# Patient Record
Sex: FEMALE | Race: WHITE | Employment: FULL TIME | URBAN - METROPOLITAN AREA
[De-identification: names, ages, dates, MRNs, and addresses within clinical notes are randomized per-mention and may not be internally consistent; named-entity substitution may affect disease eponyms.]

---

## 2018-08-14 ENCOUNTER — HOSPITAL ENCOUNTER (OUTPATIENT)
Dept: GENERAL RADIOLOGY | Age: 28
Discharge: HOME | End: 2018-08-14
Payer: COMMERCIAL

## 2018-08-14 ENCOUNTER — HOSPITAL ENCOUNTER (OUTPATIENT)
Dept: WOUND CARE | Age: 28
Discharge: HOME | End: 2018-08-14
Payer: COMMERCIAL

## 2018-08-14 ENCOUNTER — HOSPITAL ENCOUNTER (OUTPATIENT)
Age: 28
Discharge: HOME | End: 2018-08-14
Payer: COMMERCIAL

## 2018-08-14 VITALS
TEMPERATURE: 97.9 F | RESPIRATION RATE: 18 BRPM | DIASTOLIC BLOOD PRESSURE: 83 MMHG | SYSTOLIC BLOOD PRESSURE: 124 MMHG | HEART RATE: 103 BPM

## 2018-08-14 DIAGNOSIS — L02.612 CELLULITIS AND ABSCESS OF TOE OF LEFT FOOT: ICD-10-CM

## 2018-08-14 DIAGNOSIS — L03.032 CELLULITIS OF TOE OF LEFT FOOT: Primary | ICD-10-CM

## 2018-08-14 DIAGNOSIS — L03.032 CELLULITIS AND ABSCESS OF TOE OF LEFT FOOT: ICD-10-CM

## 2018-08-14 DIAGNOSIS — L03.032 CELLULITIS OF TOE OF LEFT FOOT: ICD-10-CM

## 2018-08-14 PROCEDURE — 73630 X-RAY EXAM OF FOOT: CPT

## 2018-08-14 PROCEDURE — 99202 OFFICE O/P NEW SF 15 MIN: CPT

## 2018-08-14 PROCEDURE — 6370000000 HC RX 637 (ALT 250 FOR IP): Performed by: PODIATRIST

## 2018-08-14 RX ORDER — LIDOCAINE HYDROCHLORIDE 40 MG/ML
2.5 SOLUTION TOPICAL ONCE
Status: COMPLETED | OUTPATIENT
Start: 2018-08-14 | End: 2018-08-14

## 2018-08-14 RX ORDER — MIRABEGRON 50 MG/1
1 TABLET, FILM COATED, EXTENDED RELEASE ORAL DAILY
COMMUNITY
Start: 2018-08-05 | End: 2019-01-16 | Stop reason: SDUPTHER

## 2018-08-14 RX ORDER — IBUPROFEN 200 MG
200 TABLET ORAL EVERY 6 HOURS PRN
COMMUNITY

## 2018-08-14 RX ORDER — OXYBUTYNIN CHLORIDE 15 MG/1
15 TABLET, EXTENDED RELEASE ORAL DAILY
COMMUNITY
End: 2019-05-14 | Stop reason: SDUPTHER

## 2018-08-14 RX ORDER — SULFAMETHOXAZOLE AND TRIMETHOPRIM 800; 160 MG/1; MG/1
1 TABLET ORAL 2 TIMES DAILY
Qty: 20 TABLET | Refills: 0 | Status: SHIPPED | OUTPATIENT
Start: 2018-08-14 | End: 2018-08-24

## 2018-08-14 RX ADMIN — LIDOCAINE HYDROCHLORIDE 2.5 ML: 40 SOLUTION TOPICAL at 14:30

## 2018-08-16 PROBLEM — L02.619 CELLULITIS AND ABSCESS OF TOE: Status: ACTIVE | Noted: 2018-08-16

## 2018-08-16 PROBLEM — L03.039 CELLULITIS AND ABSCESS OF TOE: Status: ACTIVE | Noted: 2018-08-16

## 2018-08-16 NOTE — PROGRESS NOTES
[facility]   Progress Note and Procedure Note      Ju Cates  MEDICAL RECORD NUMBER:  [id number]  AGE: 28 y.o. GENDER: female  : 1990  EPISODE DATE:  2018    Subjective:     Chief Complaint   Patient presents with    Wound Check     initial visit - has a wound on left great toe since last evening         HISTORY of PRESENT ILLNESS HPI     Ju Cates is a 28 y.o. female who presents today for wound/ulcer evaluation. History of Wound Context: patient presents today with a cc of a wound on her left great toe. She has had a history of multiple wounds on her left foot with several episodes of OM that have been treated with IV abx and surgery. She is concerned that she might be developing another infection as she relates that the area became red yesterday. 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 Wound/Ulcer Pain Timing/Severity: none  Quality of pain: N/A  Severity:  0 / 10   Modifying Factors: None  Associated Signs/Symptoms: none    Ulcer Identification:  Ulcer Type: neuropathic  Contributing Factors: spinabifida    Wound: N/A        PAST MEDICAL HISTORY        Diagnosis Date    Spina bifida [facility])        PAST SURGICAL HISTORY    Past Surgical History:   Procedure Laterality Date    TOE AMPUTATION  2016       FAMILY HISTORY    History reviewed. No pertinent family history. SOCIAL HISTORY    Social History   Substance Use Topics    Smoking status: Never Smoker    Smokeless tobacco: Never Used    Alcohol use Yes       ALLERGIES    Allergies   Allergen Reactions    Morphine Hives       MEDICATIONS    No current outpatient prescriptions on file prior to encounter. No current facility-administered medications on file prior to encounter. REVIEW OF SYSTEMS    Pertinent items are noted in HPI. Review of Systems: A 12 point review of symptoms is unremarkable with the exception of the chief complaint.   Patient

## 2018-08-21 ENCOUNTER — HOSPITAL ENCOUNTER (OUTPATIENT)
Dept: WOUND CARE | Age: 28
Discharge: HOME | End: 2018-08-21
Payer: COMMERCIAL

## 2018-08-21 VITALS
RESPIRATION RATE: 18 BRPM | TEMPERATURE: 98 F | HEART RATE: 84 BPM | DIASTOLIC BLOOD PRESSURE: 69 MMHG | SYSTOLIC BLOOD PRESSURE: 117 MMHG

## 2018-08-21 DIAGNOSIS — L03.032 CELLULITIS AND ABSCESS OF TOE OF LEFT FOOT: ICD-10-CM

## 2018-08-21 DIAGNOSIS — L02.612 CELLULITIS AND ABSCESS OF TOE OF LEFT FOOT: ICD-10-CM

## 2018-08-21 PROCEDURE — 6370000000 HC RX 637 (ALT 250 FOR IP): Performed by: PODIATRIST

## 2018-08-21 PROCEDURE — 11042 DBRDMT SUBQ TIS 1ST 20SQCM/<: CPT

## 2018-08-21 RX ORDER — LIDOCAINE HYDROCHLORIDE 40 MG/ML
SOLUTION TOPICAL ONCE
Status: COMPLETED | OUTPATIENT
Start: 2018-08-21 | End: 2018-08-21

## 2018-08-21 RX ADMIN — LIDOCAINE HYDROCHLORIDE 2.5 ML: 40 SOLUTION TOPICAL at 16:04

## 2018-08-22 ENCOUNTER — OFFICE VISIT (OUTPATIENT)
Dept: INTERNAL MEDICINE CLINIC | Age: 28
End: 2018-08-22

## 2018-08-22 VITALS
BODY MASS INDEX: 18.57 KG/M2 | HEIGHT: 69 IN | SYSTOLIC BLOOD PRESSURE: 110 MMHG | OXYGEN SATURATION: 98 % | DIASTOLIC BLOOD PRESSURE: 70 MMHG | HEART RATE: 68 BPM | WEIGHT: 125.4 LBS | TEMPERATURE: 98.5 F

## 2018-08-22 DIAGNOSIS — Z87.39 HX OF OSTEOMYELITIS: ICD-10-CM

## 2018-08-22 DIAGNOSIS — Q05.7 SPINA BIFIDA OF LUMBAR REGION WITHOUT HYDROCEPHALUS (HCC): ICD-10-CM

## 2018-08-22 DIAGNOSIS — Z00.00 ROUTINE GENERAL MEDICAL EXAMINATION AT A HEALTH CARE FACILITY: Primary | ICD-10-CM

## 2018-08-22 DIAGNOSIS — N31.9 NEUROGENIC BLADDER: ICD-10-CM

## 2018-08-22 PROCEDURE — 99385 PREV VISIT NEW AGE 18-39: CPT | Performed by: NURSE PRACTITIONER

## 2018-08-22 ASSESSMENT — PATIENT HEALTH QUESTIONNAIRE - PHQ9
SUM OF ALL RESPONSES TO PHQ QUESTIONS 1-9: 0
SUM OF ALL RESPONSES TO PHQ QUESTIONS 1-9: 0
2. FEELING DOWN, DEPRESSED OR HOPELESS: 0
SUM OF ALL RESPONSES TO PHQ9 QUESTIONS 1 & 2: 0
1. LITTLE INTEREST OR PLEASURE IN DOING THINGS: 0

## 2018-08-22 NOTE — PROGRESS NOTES
Subjective:      Patient ID: Ronan Hendricks is a 33 y.o. female. HPI 34 yo female presents to clinic for fasting cpe. She has medical history significant for spina bifida, neurogenic bladder, several bouts of osteomylitis, toe amputations. She has no current concerns. At some point, she is looking for referral to urology as she would like to discuss pregnancy soon. She is also requesting Rx for orthotics today. Wears braces to bilateral legs    She is utd on pap, last pap 2016 in [address] [place] [address] Road. Has mirena placed this past October. Works as RN, utd on all immunizations    RN at [employer]    Patient past medical history, family history, social, and smoking reviewed and updated as pertinent. Health maintenance has been reviewed. Prior to Visit Medications    Medication Sig Taking? Authorizing Provider   oxybutynin (DITROPAN XL) 15 MG extended release tablet Take 15 mg by mouth daily Yes Historical Provider, MD   MYRBETRIQ 50 MG TB24 Take 1 tablet by mouth daily Yes Historical Provider, MD   Levonorgestrel (MIRENA, 52 MG, IU) by Intrauterine route Yes Historical Provider, MD   ibuprofen (ADVIL;MOTRIN) 200 MG tablet Take 200 mg by mouth every 6 hours as needed for Pain  Historical Provider, MD   sulfamethoxazole-trimethoprim (BACTRIM DS) 800-160 MG per tablet Take 1 tablet by mouth 2 times daily for 10 days  Lencho Nieves DPM         Review of Systems   Constitutional: Negative. Genitourinary: Negative. Skin: Positive for wound. Current toe wound. This is being managed by Dr. Mac Edward    All other systems reviewed and are negative. Objective:   Physical Exam   Constitutional: She is oriented to person, place, and time. Vital signs are normal. She appears well-developed and well-nourished. HENT:   Head: Normocephalic.    Right Ear: Hearing, tympanic membrane, external ear and ear canal normal.   Left Ear: Hearing, tympanic membrane, external ear and ear canal normal.   Nose: Nose normal. Right sinus exhibits no maxillary sinus tenderness and no frontal sinus tenderness. Left sinus exhibits no maxillary sinus tenderness and no frontal sinus tenderness. Mouth/Throat: Oropharynx is clear and moist and mucous membranes are normal.   Eyes: Pupils are equal, round, and reactive to light. Conjunctivae, EOM and lids are normal.   Neck: Trachea normal, normal range of motion and full passive range of motion without pain. Cardiovascular: Normal rate, regular rhythm, S1 normal, S2 normal, normal heart sounds, intact distal pulses and normal pulses. Pulmonary/Chest: Effort normal and breath sounds normal.   Abdominal: Soft. Normal appearance and bowel sounds are normal. There is no tenderness. Musculoskeletal: Normal range of motion. Leg braces to bilateral lower extremities   Lymphadenopathy:     She has no cervical adenopathy. She has no axillary adenopathy. Neurological: She is alert and oriented to person, place, and time. She has normal strength and normal reflexes. Skin: Skin is warm, dry and intact. Psychiatric: She has a normal mood and affect. Her speech is normal and behavior is normal. Judgment and thought content normal.       Assessment:       Diagnosis Orders   1. Routine general medical examination at a health care facility  Basic Metabolic Panel    CBC Auto Differential    Lipid Panel    TSH with Reflex   2. Spina bifida of lumbar region without hydrocephalus ([address]     3. Neurogenic bladder     4.  Hx of osteomyelitis             Plan:      Labs as above  rx written for orthotics(she has appt for this today)  She will elt me know when ready for urology referral  rtc 1 year WILLIAM Bonds - CNP

## 2018-08-22 NOTE — PROGRESS NOTES
[facility]   Progress Note and Procedure Note      Radha Rios  MEDICAL RECORD NUMBER:  [id number]  AGE: 33 y.o. GENDER: female  : 1990  EPISODE DATE:  2018    Subjective:     Chief Complaint   Patient presents with    Wound Check     f/u left great toe         HISTORY of PRESENT ILLNESS HPI     Radha Rios is a 33 y.o. female who presents today for wound/ulcer evaluation. History of Wound Context: patient presents today with a cc of a wound on her left great toe. She has had a history of multiple wounds on her left foot with several episodes of OM that have been treated with IV abx and surgery. She is concerned that she might be developing another infection as she relates that the area became red yesterday. 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 Patient relates she has an appointment to be fitted for new AFOs tomorrow  Wound/Ulcer Pain Timing/Severity: none  Quality of pain: N/A  Severity:  0 / 10   Modifying Factors: None  Associated Signs/Symptoms: none    Ulcer Identification:  Ulcer Type: neuropathic  Contributing Factors: spinabifida    Wound: N/A        PAST MEDICAL HISTORY        Diagnosis Date    Spina bifida [facility])        PAST SURGICAL HISTORY    Past Surgical History:   Procedure Laterality Date    TOE AMPUTATION         FAMILY HISTORY    History reviewed. No pertinent family history.     SOCIAL HISTORY    Social History   Substance Use Topics    Smoking status: Never Smoker    Smokeless tobacco: Never Used    Alcohol use Yes       ALLERGIES    Allergies   Allergen Reactions    Morphine Hives       MEDICATIONS    Current Outpatient Prescriptions on File Prior to Encounter   Medication Sig Dispense Refill    oxybutynin (DITROPAN XL) 15 MG extended release tablet Take 15 mg by mouth daily      MYRBETRIQ 50 MG TB24 Take 1 tablet by mouth daily      ibuprofen (ADVIL;MOTRIN) 200 MG tablet Take 200 mg by mouth the medial aspect of the hallux without any evidence of soft tissue emphysema or osteomyelitis. Excisional Debridement Procedure Note  Indications:  Based on my examination of this patient's wound(s)/ulcer(s) today, debridement is required to promote healing and evaluate the wound base. Performed by: Barbara Lynn DPM    Consent obtained? Yes    Time out taken: Yes    Pain Control: Anesthetic: 4% Lidocaine Liquid Topical     Debridement:Excisional Debridement    Using #15 blade scalpel the wound/ulcer was sharply debrided    down through and included excision of  epidermis, dermis and subcutaneous tissue.         Devitalized Tissue Debrided:  fibrin and slough      Pre Debridement Measurements:  Are located in the Wound/Ulcer Documentation Flow Sheet   Wound/Ulcer #: 1     Post  Debridement Measurements:  Wound 08/14/18 #1 Left Great Toe (has had since 8/13/18) (Active)   Wound Image   8/14/2018  2:07 PM   Wound Type Wound 8/21/2018  3:47 PM   Wound Pressure Stage  2 8/21/2018  3:47 PM   Dressing/Treatment Other (Comment) 8/21/2018  3:47 PM   Wound Cleansed Rinsed/Irrigated with saline 8/21/2018  3:47 PM   Wound Length (cm) 0.5 cm 8/21/2018  3:47 PM   Wound Width (cm) 0.5 cm 8/21/2018  3:47 PM   Wound Depth (cm)  0.1 8/21/2018  3:47 PM   Calculated Wound Size (cm^2) (l*w) 0.09 cm^2 8/21/2018  3:47 PM   Distance Tunneling (cm) 0 cm 8/21/2018  3:47 PM   Undermining Maxium Distance (cm) 0 8/21/2018  3:47 PM   Wound Assessment Red;Slough 8/21/2018  3:47 PM   Drainage Amount Scant 8/21/2018  3:47 PM   Drainage Description Serosanguinous 8/21/2018  3:47 PM   Odor None 8/21/2018  3:47 PM   Margins Defined edges 8/21/2018  3:47 PM   Trish-wound Assessment Calloused 8/21/2018  3:47 PM   Non-staged Wound Description Partial thickness 8/21/2018  3:47 PM   [place]%Wound Bed 0 8/21/2018  3:47 PM   Red%Wound Bed 100 8/21/2018  3:47 PM   Op First Treatment Date 08/14/18 8/14/2018  2:07 PM   Number of days: 7       Percent of Device: [] Walker [] Crutches  [] Wheelchair [] Roll About   [] Surgical shoe/Darco    [] Podus Boot(s)   [] Prevalon Boot(s)  [] Seema Lila    [] [employer] [] Other:      Dietary:  Important dietary reminders:  1. Increase Protein intake (i.e. Lean meats, fish, eggs, legumes, and yogurt)  2. No added salt  3. If diabetic, follow a diabetic diet and check glucose prior to meals or as instructed by your physician. Dietary Supplements:  [] Ensure EnLive [] Deep [] 30ml ProMod/ProStat   [] Other:   Take supplements twice a day or as directed as followed:       Return Appointment:  [] Wound and dressing supply provider:   [] ECF or Home Healthcare:  [] Nurse visit:    [x] Return Appointment: With Dr Tressa Maciel  in  [facility])    [] Ordered tests:  [] Blood work [] Vascular Test(arterial/venous)   [] X-ray  [] Wound Culture     Referrals: (see attached referral)    [] Weight Management [] Diabetes Education [] Vascular Surgery      [] Endocrinology  [] Nutrition Counseling  [] Lymphedema Therapy                  [] Plastic & Rescontruction Surgery    Your nurse  is:  Angelo Mcfarlane     Electronically signed by Gunner Cano RN on 8/21/2018 at [address] Information: Should you experience any significant changes in your wound(s) or have questions about your wound care, please contact the [address] [place] [address] at [phone] Monday-Friday from 8:00 am - 4:30 pm except for Wednesdays which hours are from 8:00 am - 2:00 pm.   If you need help with your wound outside these hours and cannot wait until we are again available, contact your PCP or go to the hospital emergency room. PLEASE NOTE: IF YOU ARE UNABLE TO OBTAIN WOUND SUPPLIES, CONTINUE TO USE THE SUPPLIES YOU HAVE AVAILABLE UNTIL YOU ARE ABLE TO REACH US. IT IS MOST IMPORTANT TO KEEP THE WOUND COVERED AT ALL TIMES.       Physician orders by:     [x] Dr Luigi Li [] Dr Letha Solis    [] Dr Maria Luisa Barakat     [] Dr Kayla Harris   [] Dr Marjorie Jin  [] Dr Poli Salcido  [] Dr Jaimee Hastings       Physician Signature:__________________________________        The information contained in the After Visit Summary has been reviewed with me, the patient and/or responsible adult, by my health care provider(s). I had the opportunity to ask questions regarding this information.   I have elected to receive;      [] Patient unable to sign Discharge Instructions given to ECF/Transportation/POA          Electronically signed by Lyudmila Blair DPM on 8/22/2018 at 10:00 AM

## 2018-08-23 ENCOUNTER — HOSPITAL ENCOUNTER (OUTPATIENT)
Dept: WOUND CARE | Age: 28
Discharge: HOME | End: 2018-08-23
Attending: PODIATRIST | Admitting: PODIATRIST

## 2018-08-23 VITALS
DIASTOLIC BLOOD PRESSURE: 82 MMHG | HEART RATE: 84 BPM | HEIGHT: 69 IN | TEMPERATURE: 98.1 F | RESPIRATION RATE: 18 BRPM | BODY MASS INDEX: 18.52 KG/M2 | SYSTOLIC BLOOD PRESSURE: 124 MMHG

## 2018-08-23 RX ORDER — LIDOCAINE HYDROCHLORIDE 40 MG/ML
SOLUTION TOPICAL PRN
Status: DISCONTINUED | OUTPATIENT
Start: 2018-08-23 | End: 2018-08-24 | Stop reason: HOSPADM

## 2018-08-23 NOTE — PROGRESS NOTES
includes the subcutaneous tissue. After debridement the wound has a granular base. There is no surrounding erythema, edema, warmth or malodor noted. The wound does not probe or track to bone. The previously noted erythema has resolved since starting Bactrim. X-rays 3 views of the left foot were reviewed patient has had multiple metatarsal resections of the second third and fourth metatarsals from previous infections. There is a screw transversing the hallux IPJ were previous fusion has been performed. There is an area of cortical irregularity noted that corresponds with an abnormal pressure points on the medial aspect of the hallux without any evidence of soft tissue emphysema or osteomyelitis. Assessment:      Patient Active Problem List   Diagnosis Code    Cellulitis and abscess of toe L03.039, L02.619    [facility] lumbar [facility] Q05.7    Neurogenic bladder N31.9    Hx of osteomyelitis Z87.39          Plan:   E/M x 30 minutes. Continue bactrim DS until gone. Discussed with patient at length that she may need surgical intervention to balance her foot to prevent further ulcerations. However she relates that she has an appointment to have her AFO's replaced, so will wait to see if this provides a better weight bearing surface and decreases the abnormal pressure points. Patient was very upset and emotional due to her history of multiple foot ulcerations and infections. She is concerned that as she is getting  soon that this may interfere with her wedding. Discussed with patient at length that if the wound does not continue to progress consideration will be given to a total contact cast to better offload the area. Patient was concerned that she would not be able to work with that on but assured patient that we can create a closed toe cast.    A well padded TCC was applied to the left lower extremity.   This was not done on Tuesday because I wanted TCC    Apply: [x] Total Contact Cast Applied in Clinic []RightLeg [x]Left Leg   [x] Do not get cast wet. Contact center or go to emergency room if there is a foul odor or becomes uncomfortable due to feeling tight or swelling. Do not use objects inside of cast to scratch. Dietary:  [x] Diet as tolerated: [] Calorie Diabetic Diet: [] No Added Salt:  [] Increase Protein: [] Other:   Activity:  [x] Activity as tolerated:  [] Patient has no activity restrictions     [] Strict Bedrest: [] Remain off Work:     [] May return to full duty work:                                   [] Return to work with restrictions:     Return Appointment:  [] Wound and dressing supply provider:   [] ECF or Home Healthcare:  [] Nurse visit:  [] Physician or NP scheduled for Nurse Visit:   [] Return Appointment: With Dr. Joelle Acevedo in 1 Week(s) *PLEASE SCHEDULE FOR 8 AM*  [] Ordered tests:     Nurse Case Manger:  [address]   Electronically signed by Ameena Taylor RN on 8/23/2018 at 8:54 [address] Information: Should you experience any significant changes in your wound(s) or have questions about your wound care, please contact the [address] [place] [address] at [address] 8:30 am - 4:30 pm and Friday 8:30 am - 1:00 pm.  If you need help with your wound outside these hours and cannot wait until we are again available, contact your PCP or go to the hospital emergency room. Nurse Signature:_______________________    Date: ___________ Time:  ____________      Discharge Nurse Signature      PLEASE NOTE: IF YOU ARE UNABLE TO OBTAIN WOUND SUPPLIES, CONTINUE TO USE THE SUPPLIES YOU HAVE AVAILABLE UNTIL YOU ARE ABLE TO [facility]. IT IS MOST IMPORTANT TO KEEP THE WOUND COVERED AT ALL TIMES.      Physician Signature:_______________________    Date: ___________ Time:  ____________       [x] Dr Shahida Russo         The information contained in the After Visit Summary has been reviewed with me, the patient and/or responsible

## 2018-08-23 NOTE — PROGRESS NOTES
Contact Cast    NAME:  Selwyn Ball  YOB: 1990  MEDICAL RECORD NUMBER:  [id number]  DATE:  8/23/2018    Goal:  Patient will maintain integrity of cast, avoid mobility hazards, and report complications that may occur (foul odor, pain, numbness, cracked cast).  [] Removed old contact cast if indicated and wash extremity with soap and water.  [x] Applied ordered dressing. Normal saline moist mohinder guaze     Applied per nursing  [employer] to left lower extremity   [x] Allow cast to dry.  [x] Instructed patient to report to health care provider, including wound care center, any back pain, hip pain, or leg pain, numbness of toes, or any odor  coming from the cast.    [x] Instructed patient not to stick any foreign objects down into cast.    [x] Instructed patient to utilize assistive devices(crutches, cane or walker) as ordered    [x] Instructed patient to continue offloading as directed.      Applied cast per  Guidelines    Electronically signed by Nathalia Arreola RN on 8/23/2018 at 9:09 AM

## 2018-08-27 LAB
ANION GAP SERPL CALCULATED.3IONS-SCNC: 12 MMOL/L (ref 3–16)
BASOPHILS ABSOLUTE: 0 K/UL (ref 0–0.2)
BASOPHILS RELATIVE PERCENT: 0.7 %
BUN BLDV-MCNC: 11 MG/DL (ref 7–20)
CALCIUM SERPL-MCNC: 9.3 MG/DL (ref 8.3–10.6)
CHLORIDE BLD-SCNC: 102 MMOL/L (ref 99–110)
CHOLESTEROL, TOTAL: 126 MG/DL (ref 0–199)
CO2: 26 MMOL/L (ref 21–32)
CREAT SERPL-MCNC: 0.6 MG/DL (ref 0.6–1.1)
EOSINOPHILS ABSOLUTE: 0.1 K/UL (ref 0–0.6)
EOSINOPHILS RELATIVE PERCENT: 2.4 %
GFR AFRICAN AMERICAN: >60
GFR NON-AFRICAN AMERICAN: >60
GLUCOSE BLD-MCNC: 85 MG/DL (ref 70–99)
HCT VFR BLD CALC: 38.8 % (ref 36–48)
HDLC SERPL-MCNC: 56 MG/DL (ref 40–60)
HEMOGLOBIN: 12.9 G/DL (ref 12–16)
LDL CHOLESTEROL CALCULATED: 58 MG/DL
LYMPHOCYTES ABSOLUTE: 1.4 K/UL (ref 1–5.1)
LYMPHOCYTES RELATIVE PERCENT: 35.6 %
MCH RBC QN AUTO: 31.5 PG (ref 26–34)
MCHC RBC AUTO-ENTMCNC: 33.2 G/DL (ref 31–36)
MCV RBC AUTO: 94.7 FL (ref 80–100)
MONOCYTES ABSOLUTE: 0.3 K/UL (ref 0–1.3)
MONOCYTES RELATIVE PERCENT: 7.1 %
NEUTROPHILS ABSOLUTE: 2.2 K/UL (ref 1.7–7.7)
NEUTROPHILS RELATIVE PERCENT: 54.2 %
PDW BLD-RTO: 13.4 % (ref 12.4–15.4)
PLATELET # BLD: 237 K/UL (ref 135–450)
PMV BLD AUTO: 9 FL (ref 5–10.5)
POTASSIUM SERPL-SCNC: 4.1 MMOL/L (ref 3.5–5.1)
RBC # BLD: 4.1 M/UL (ref 4–5.2)
SODIUM BLD-SCNC: 140 MMOL/L (ref 136–145)
TRIGL SERPL-MCNC: 58 MG/DL (ref 0–150)
TSH REFLEX: 1.14 UIU/ML (ref 0.27–4.2)
VLDLC SERPL CALC-MCNC: 12 MG/DL
WBC # BLD: 4 K/UL (ref 4–11)

## 2018-08-30 ENCOUNTER — HOSPITAL ENCOUNTER (OUTPATIENT)
Dept: WOUND CARE | Age: 28
Discharge: HOME | End: 2018-08-30
Attending: PODIATRIST | Admitting: PODIATRIST

## 2018-08-30 VITALS
HEART RATE: 54 BPM | RESPIRATION RATE: 18 BRPM | SYSTOLIC BLOOD PRESSURE: 103 MMHG | DIASTOLIC BLOOD PRESSURE: 68 MMHG | TEMPERATURE: 98.1 F

## 2018-09-02 NOTE — PROGRESS NOTES
[facility]   Progress Note and Procedure Note      Yuridia Rees  MEDICAL RECORD NUMBER:  [id number]  AGE: 33 y.o. GENDER: female  : 1990  EPISODE DATE:  2018    Subjective:     Chief Complaint   Patient presents with    Wound Check     follow up left great toe wound         HISTORY of PRESENT ILLNESS HPI     Yuridia Rees is a 33 y.o. female who presents today for wound/ulcer evaluation. History of Wound Context: patient presents today with a cc of a wound on her left great toe. She has had a history of multiple wounds on her left foot with several episodes of OM that have been treated with IV abx and surgery. She is concerned that she might be developing another infection as she relates that the area became red yesterday. 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 Patient relates she had an appointment to be fitted for new AFOs yesterday and they will be ready in a few weeks. Patient tolerated TCC well.    Wound/Ulcer Pain Timing/Severity: none  Quality of pain: N/A  Severity:  0 / 10   Modifying Factors: None  Associated Signs/Symptoms: none    Ulcer Identification:  Ulcer Type: neuropathic  Contributing Factors: spinabifida    Wound: N/A        PAST MEDICAL HISTORY        Diagnosis Date    Acute osteomyelitis ([address]     left foot- , ,,2016, 2017    Neurogenic bladder     Spina bifida ([address]        PAST SURGICAL HISTORY    Past Surgical History:   Procedure Laterality Date    APPENDECTOMY      FOOT SURGERY      SPINE SURGERY      Spinal cord detethering     TOE AMPUTATION  2016       FAMILY HISTORY    Family History   Problem Relation Age of Onset    Diabetes Mother     High Blood Pressure Mother     High Cholesterol Mother     High Cholesterol Father     Cancer Maternal Grandmother         lung cancer    Cancer Maternal Grandfather         pancreatic cancer       SOCIAL HISTORY    Social History   Substance go to the hospital emergency room.      Nurse Signature:_______________________     Date: ___________ Time:  ____________        Discharge Nurse Signature        PLEASE NOTE: IF YOU ARE UNABLE TO OBTAIN WOUND SUPPLIES, CONTINUE TO USE THE SUPPLIES YOU HAVE AVAILABLE UNTIL YOU ARE ABLE TO REACH US. IT IS MOST IMPORTANT TO KEEP THE WOUND COVERED AT ALL TIMES. Physician Signature:_______________________     Date: ___________ Time:  ____________                    [x] Dr Vanessa Mckeon           The information contained in the After Visit Summary has been reviewed with me, the patient and/or responsible adult, by my health care provider(s). I had the opportunity to ask questions regarding this information.   I have elected to receive;        Patient Signature:_______________________     Date: ___________ Time:  ____________     [] Patient unable to sign Discharge Instructions given to ECF/Transportation/POA        [x]  After Visit Summary  []  Comprehensive Discharge Instruction        Electronically signed by Vanessa Mckeon DPM on 9/2/2018 at 3:56 PM

## 2018-09-06 ENCOUNTER — HOSPITAL ENCOUNTER (OUTPATIENT)
Dept: WOUND CARE | Age: 28
Discharge: HOME | End: 2018-09-06
Attending: PODIATRIST | Admitting: PODIATRIST

## 2018-09-06 VITALS
SYSTOLIC BLOOD PRESSURE: 100 MMHG | DIASTOLIC BLOOD PRESSURE: 65 MMHG | TEMPERATURE: 97.4 F | HEART RATE: 54 BPM | RESPIRATION RATE: 20 BRPM

## 2018-09-06 NOTE — PLAN OF CARE
Contact Cast    NAME:  Selwyn Ball  YOB: 1990  MEDICAL RECORD NUMBER:  [id number]  DATE:  9/6/2018    Goal:  Patient will maintain integrity of cast, avoid mobility hazards, and report complications that may occur (foul odor, pain, numbness, cracked cast).  [x] Removed old contact cast if indicated and wash extremity with soap and water.  [x] Applied ordered dressing.    Applied per Dayne Snow RN     Applied to left lower extremity   [x] Allow cast to dry.  [x] Instructed patient to report to health care provider, including wound care center, any back pain, hip pain, or leg pain, numbness of toes, or any odor  coming from the cast.    [x] Instructed patient not to stick any foreign objects down into cast.    [x] Instructed patient to utilize assistive devices(crutches, cane or walker) as ordered    [x] Instructed patient to continue offloading as directed.      Applied cast per  Guidelines    Electronically signed by Johnson Barton RN on 9/6/2018 at 12:19 PM

## 2018-09-10 NOTE — PROGRESS NOTES
Use Topics    Smoking status: Never Smoker    Smokeless tobacco: Never Used    Alcohol use Yes       ALLERGIES    Allergies   Allergen Reactions    Morphine Hives       MEDICATIONS    Current Outpatient Prescriptions on File Prior to Encounter   Medication Sig Dispense Refill    oxybutynin (DITROPAN XL) 15 MG extended release tablet Take 15 mg by mouth daily      MYRBETRIQ 50 MG TB24 Take 1 tablet by mouth daily      ibuprofen (ADVIL;MOTRIN) 200 MG tablet Take 200 mg by mouth every 6 hours as needed for Pain      Levonorgestrel (MIRENA, 52 MG, IU) by Intrauterine route       No current facility-administered medications on file prior to encounter. REVIEW OF SYSTEMS    Pertinent items are noted in HPI. Review of Systems: A 12 point review of symptoms is unremarkable with the exception of the chief complaint. Patient specifically denies nausea, fever, vomiting, chills, shortness of breath, chest pain, abdominal pain, constipation or difficulty urinating. Objective:      /65   Pulse 54   Temp 97.4 °F (36.3 °C) (Oral)   Resp 20     Wt Readings from Last 3 Encounters:   08/22/18 125 lb 6.4 oz (56.9 kg)       PHYSICAL EXAM    DP/PT pulses palpable bilaterally. CFT brisk to all digits. Digits are pink and warm to the touch. Hair growth is normal in appearance. No edema noted. No calf pain with palpation noted. Epicritic sensation is grossly absent bilaterally. 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 Hyperkeratotic lesion is noted at the platar medical aspect of the left great toe. After removal of the hyperkeratotic tissue the underlying skin is intact, however slightly macerated and preulcerative in nature. The previously noted erythema has resolved since starting Bactrim. X-rays 3 views of the left foot were reviewed patient has had multiple metatarsal resections of the second third and fourth metatarsals from previous infections.   There is a screw transversing the hallux IPJ were previous fusion has been performed. There is an area of cortical irregularity noted that corresponds with an abnormal pressure points on the medial aspect of the hallux without any evidence of soft tissue emphysema or osteomyelitis. Assessment:      Patient Active Problem List   Diagnosis Code    Cellulitis and abscess of toe L03.039, L02.619    Spina bifida of lumbar region without hydrocephalus ([facility] [address] Q05.7    Neurogenic bladder N31.9    Hx of osteomyelitis Z87.39          Plan:   E/M x 30 minutes. Discussed with patient at length that she may need surgical intervention to balance her foot to prevent further ulcerations. However she relates that she has an appointment to have her AFO's replaced, so will wait to see if this provides a better weight bearing surface and decreases the abnormal pressure points. A well padded TCC was applied to the left lower extremity to protect the area until her new AFO is available. Treatment Note please see attached Discharge Instructions    Written patient dismissal instructions given to patient and signed by patient or POA. RTC 1 week    Discharge Instructions       Wound Clinic Physician Orders and Discharge Instructions  [address]   [address] [place] [address]  Telephone: [phone]      FAX [phone]     NAME: Sandeep Villanueva  DATE OF BIRTH:  1990  MEDICAL RECORD NUMBER:  [id number]  DATE:  9/6/2018     Wound Cleansing:   Do not scrub or use excessive force. Cleanse wound prior to applying a clean dressing with:  [] Normal Saline            [] Keep Wound Dry in Shower    [] Wound Cleanser   [] Cleanse wound with Mild Soap & Water  [] May Shower at Discharge   [] Other:        Topical Treatments:  Do not apply lotions, creams, or ointments to wound bed unless directed.    [] Apply moisturizing lotion to skin surrounding the wound prior to dressing change.  [] Apply antifungal ointment to skin surrounding the wound prior to dressing change.  [] Apply thin film of moisture barrier ointment to skin immediately around wound. [x] Other:  Foam pad to left medial ankle                 Dressings:                  Wound Location: Left great toe wound      [x] Foam pad              Avoid contact of tape with skin.   [x] Change dressing:       [] Daily               [] Every Other Day        [] Three times per week              [] Once a week  [x] Do Not Change Dressing         [] Other:     Contact Cast: TCC     Apply:  [x] Total Contact Cast Applied in Clinic  []RightLeg          [x]Left Leg              [x] Do not get cast wet.  Contact center or go to emergency room if there is a foul odor or becomes uncomfortable due to feeling tight or swelling.  Do not use objects inside of cast to scratch.          Dietary:  [x] Diet as tolerated:        [] Calorie Diabetic Diet: [] No Added Salt:  [] Increase Protein:        [] Other:   Activity:  [x] Activity as tolerated:  [] Patient has no activity restrictions     [] Strict Bedrest:            [] Remain off Work:     [] May return to full duty work:                                       [] [employer] to work with [email or web address] [address]     Return Appointment:  [] Wound and dressing supply provider:   [] ECF or Home Healthcare:  [] Nurse visit: Joon Gamble or NP scheduled for Nurse Visit:   [x] Return Appointment: With Dr. Carol Phan in [address] [place] [address])   [] Ordered tests:      Nurse Case Manger: [facility]   Electronically signed by Melissa Calvin RN on 9/6/2018 at 12:05 PM   Yadi Taylor 281: Should you experience any significant changes in your wound(s) or have questions about your wound care, please [facility] [phone] [facility] - THURSDAY 8:30 am - 4:30 pm and Friday 8:30 am - 1:00 pm.  If you need help with your wound outside these hours and cannot wait until we are again available, contact your PCP or

## 2018-09-13 ENCOUNTER — HOSPITAL ENCOUNTER (OUTPATIENT)
Dept: WOUND CARE | Age: 28
Discharge: HOME | End: 2018-09-13
Attending: PODIATRIST | Admitting: PODIATRIST

## 2018-09-13 VITALS
TEMPERATURE: 98 F | DIASTOLIC BLOOD PRESSURE: 77 MMHG | HEART RATE: 66 BPM | SYSTOLIC BLOOD PRESSURE: 114 MMHG | RESPIRATION RATE: 18 BRPM

## 2018-09-17 NOTE — PROGRESS NOTES
IPJ were previous fusion has been performed. There is an area of cortical irregularity noted that corresponds with an abnormal pressure points on the medial aspect of the hallux without any evidence of soft tissue emphysema or osteomyelitis. Assessment:      Patient Active Problem List   Diagnosis Code    Cellulitis and abscess of toe L03.039, L02.619    Spina bifida of lumbar region without hydrocephalus ([facility] [address] Q05.7    Neurogenic bladder N31.9    Hx of osteomyelitis Z87.39          Plan:   E/M x 30 minutes. Discussed with patient at length that she may need surgical intervention to balance her foot to prevent further ulcerations. However she relates that she has an appointment to have her AFO's replaced, so will wait to see if this provides a better weight bearing surface and decreases the abnormal pressure points. OK to resume wearing new AFO. Treatment Note please see attached Discharge Instructions    Written patient dismissal instructions given to patient and signed by patient or POA. RTC prn    Discharge Instructions         [address]  Physician Orders   [facility] [place]  [address] [facility] [address] [facility] [address], [facility] [address]  Telephone: [phone]    NAME:  Brigido Kim  YOB: 1990  MEDICAL RECORD NUMBER:  [id number]  DATE:  9/13/2018    Congratulations! You have completed your treatment. 1. Return to your Primary Care Physician for all your health issues. 2. Resume your ordinary activities as tolerated. 3. Take your medications as prescribed by your primary care physician. 4. Check your skin daily for cracks, bruises, sores, or dryness. Use a moisturizer as needed. 5. Clean and dry your skin, using mild soap and warm water (not hot). 6. Avoid alcohol and caffeine and do not smoke. 7. Maintain a nutritious diet. THANK YOU FOR ALLOWING US TO SERVE YOU.   PLEASE CALL IF YOU DEVELOP

## 2019-01-17 RX ORDER — MIRABEGRON 50 MG/1
1 TABLET, FILM COATED, EXTENDED RELEASE ORAL DAILY
Qty: 90 TABLET | Refills: 1 | Status: SHIPPED | OUTPATIENT
Start: 2019-01-17 | End: 2019-07-24 | Stop reason: SDUPTHER

## 2019-02-24 ENCOUNTER — HOSPITAL ENCOUNTER (EMERGENCY)
Age: 29
Discharge: HOME | End: 2019-02-24
Attending: EMERGENCY MEDICINE
Payer: COMMERCIAL

## 2019-02-24 ENCOUNTER — APPOINTMENT (OUTPATIENT)
Dept: GENERAL RADIOLOGY | Age: 29
End: 2019-02-24
Payer: COMMERCIAL

## 2019-02-24 VITALS
RESPIRATION RATE: 16 BRPM | HEIGHT: 67 IN | TEMPERATURE: 97.9 F | SYSTOLIC BLOOD PRESSURE: 123 MMHG | DIASTOLIC BLOOD PRESSURE: 83 MMHG | WEIGHT: 120 LBS | HEART RATE: 96 BPM | OXYGEN SATURATION: 100 % | BODY MASS INDEX: 18.83 KG/M2

## 2019-02-24 DIAGNOSIS — L03.032 CELLULITIS OF GREAT TOE OF LEFT FOOT: Primary | ICD-10-CM

## 2019-02-24 LAB
ANION GAP SERPL CALCULATED.3IONS-SCNC: 11 MMOL/L (ref 3–16)
BASOPHILS ABSOLUTE: 0.1 K/UL (ref 0–0.2)
BASOPHILS RELATIVE PERCENT: 1.7 %
BUN BLDV-MCNC: 11 MG/DL (ref 7–20)
CALCIUM SERPL-MCNC: 9.4 MG/DL (ref 8.3–10.6)
CHLORIDE BLD-SCNC: 104 MMOL/L (ref 99–110)
CO2: 24 MMOL/L (ref 21–32)
CREAT SERPL-MCNC: 0.6 MG/DL (ref 0.6–1.1)
EOSINOPHILS ABSOLUTE: 0.1 K/UL (ref 0–0.6)
EOSINOPHILS RELATIVE PERCENT: 0.9 %
GFR AFRICAN AMERICAN: >60
GFR NON-AFRICAN AMERICAN: >60
GLUCOSE BLD-MCNC: 102 MG/DL (ref 70–99)
HCT VFR BLD CALC: 37.3 % (ref 36–48)
HEMOGLOBIN: 12.4 G/DL (ref 12–16)
LACTIC ACID: 0.7 MMOL/L (ref 0.4–2)
LYMPHOCYTES ABSOLUTE: 0.7 K/UL (ref 1–5.1)
LYMPHOCYTES RELATIVE PERCENT: 8.1 %
MCH RBC QN AUTO: 31 PG (ref 26–34)
MCHC RBC AUTO-ENTMCNC: 33.2 G/DL (ref 31–36)
MCV RBC AUTO: 93.2 FL (ref 80–100)
MONOCYTES ABSOLUTE: 0.6 K/UL (ref 0–1.3)
MONOCYTES RELATIVE PERCENT: 6.9 %
NEUTROPHILS ABSOLUTE: 6.7 K/UL (ref 1.7–7.7)
NEUTROPHILS RELATIVE PERCENT: 82.4 %
PDW BLD-RTO: 13 % (ref 12.4–15.4)
PLATELET # BLD: 173 K/UL (ref 135–450)
PMV BLD AUTO: 9.6 FL (ref 5–10.5)
POTASSIUM SERPL-SCNC: 3.9 MMOL/L (ref 3.5–5.1)
RBC # BLD: 4 M/UL (ref 4–5.2)
SODIUM BLD-SCNC: 139 MMOL/L (ref 136–145)
WBC # BLD: 8.1 K/UL (ref 4–11)

## 2019-02-24 PROCEDURE — 83605 ASSAY OF LACTIC ACID: CPT

## 2019-02-24 PROCEDURE — 85025 COMPLETE CBC W/AUTO DIFF WBC: CPT

## 2019-02-24 PROCEDURE — 99283 EMERGENCY DEPT VISIT LOW MDM: CPT

## 2019-02-24 PROCEDURE — 87040 BLOOD CULTURE FOR BACTERIA: CPT

## 2019-02-24 PROCEDURE — 80048 BASIC METABOLIC PNL TOTAL CA: CPT

## 2019-02-24 PROCEDURE — 6360000002 HC RX W HCPCS: Performed by: EMERGENCY MEDICINE

## 2019-02-24 PROCEDURE — 2580000003 HC RX 258: Performed by: EMERGENCY MEDICINE

## 2019-02-24 PROCEDURE — 73660 X-RAY EXAM OF TOE(S): CPT

## 2019-02-24 PROCEDURE — 96365 THER/PROPH/DIAG IV INF INIT: CPT

## 2019-02-24 RX ORDER — SULFAMETHOXAZOLE AND TRIMETHOPRIM 800; 160 MG/1; MG/1
1 TABLET ORAL 2 TIMES DAILY
Qty: 20 TABLET | Refills: 0 | Status: SHIPPED | OUTPATIENT
Start: 2019-02-24 | End: 2019-03-06

## 2019-02-24 RX ORDER — CEPHALEXIN 500 MG/1
500 CAPSULE ORAL 4 TIMES DAILY
Qty: 40 CAPSULE | Refills: 0 | Status: SHIPPED | OUTPATIENT
Start: 2019-02-24 | End: 2019-03-06

## 2019-02-24 RX ADMIN — CEFAZOLIN 1 G: 1 INJECTION, POWDER, FOR SOLUTION INTRAMUSCULAR; INTRAVENOUS at 09:59

## 2019-02-24 ASSESSMENT — PAIN DESCRIPTION - ORIENTATION: ORIENTATION: LEFT

## 2019-02-24 ASSESSMENT — PAIN DESCRIPTION - FREQUENCY: FREQUENCY: CONTINUOUS

## 2019-02-24 ASSESSMENT — PAIN DESCRIPTION - DESCRIPTORS: DESCRIPTORS: THROBBING

## 2019-02-24 ASSESSMENT — PAIN SCALES - GENERAL: PAINLEVEL_OUTOF10: 6

## 2019-02-24 ASSESSMENT — PAIN DESCRIPTION - LOCATION: LOCATION: FOOT

## 2019-03-01 LAB — BLOOD CULTURE, ROUTINE: NORMAL

## 2019-04-11 ENCOUNTER — HOSPITAL ENCOUNTER (OUTPATIENT)
Dept: WOUND CARE | Age: 29
Discharge: HOME | End: 2019-04-11
Payer: COMMERCIAL

## 2019-04-11 VITALS
HEIGHT: 67 IN | BODY MASS INDEX: 19.27 KG/M2 | DIASTOLIC BLOOD PRESSURE: 79 MMHG | RESPIRATION RATE: 16 BRPM | SYSTOLIC BLOOD PRESSURE: 112 MMHG | WEIGHT: 122.8 LBS | TEMPERATURE: 97.8 F | HEART RATE: 57 BPM

## 2019-04-11 PROCEDURE — 99213 OFFICE O/P EST LOW 20 MIN: CPT

## 2019-04-11 PROCEDURE — 11042 DBRDMT SUBQ TIS 1ST 20SQCM/<: CPT

## 2019-04-11 RX ORDER — LIDOCAINE 50 MG/G
OINTMENT TOPICAL PRN
Status: DISCONTINUED | OUTPATIENT
Start: 2019-04-11 | End: 2019-04-12 | Stop reason: HOSPADM

## 2019-04-11 RX ORDER — FOLIC ACID 1 MG/1
1 TABLET ORAL DAILY
COMMUNITY

## 2019-04-11 ASSESSMENT — PAIN DESCRIPTION - ORIENTATION: ORIENTATION: LEFT

## 2019-04-11 ASSESSMENT — PAIN SCALES - GENERAL
PAINLEVEL_OUTOF10: 1
PAINLEVEL_OUTOF10: 1

## 2019-04-11 ASSESSMENT — PAIN DESCRIPTION - DESCRIPTORS: DESCRIPTORS: THROBBING

## 2019-04-11 ASSESSMENT — PAIN DESCRIPTION - PROGRESSION: CLINICAL_PROGRESSION: NOT CHANGED

## 2019-04-11 ASSESSMENT — PAIN - FUNCTIONAL ASSESSMENT: PAIN_FUNCTIONAL_ASSESSMENT: ACTIVITIES ARE NOT PREVENTED

## 2019-04-11 ASSESSMENT — PAIN DESCRIPTION - PAIN TYPE: TYPE: ACUTE PAIN

## 2019-04-11 ASSESSMENT — PAIN DESCRIPTION - FREQUENCY: FREQUENCY: INTERMITTENT

## 2019-04-11 ASSESSMENT — PAIN DESCRIPTION - LOCATION: LOCATION: TOE (COMMENT WHICH ONE)

## 2019-04-11 NOTE — PLAN OF CARE
Contact Cast    NAME:  Laura Taylor  YOB: 1990  MEDICAL RECORD NUMBER:  [id number]  DATE:  4/11/2019    Goal:  Patient will maintain integrity of cast, avoid mobility hazards, and report complications that may occur (foul odor, pain, numbness, cracked cast). Removed old contact cast if indicated and wash extremity with soap and water. Applied ordered dressing. Applied per Liza Singer RN  Allow cast to dry. Instructed patient to report to health care provider, including wound care center, any back pain, hip pain, or leg pain, numbness of toes, or any odor coming from the cast.   Instructed patient not to stick any foreign objects down into cast.  Instructed patient to utilize assistive devices(crutches, cane or walker) as ordered. Instructed patient to continue offloading as directed.     Applied in Clinic to Left Lower Leg      Applied cast per  Guidelines    Electronically signed by Jennifer Olmos RN on 4/12/2019 at 12:03 PM    .

## 2019-04-11 NOTE — PROGRESS NOTES
 Smoking status: Never Smoker    Smokeless tobacco: Never Used   Substance Use Topics    Alcohol use: Yes     Alcohol/week: 1.2 oz     Types: 2 Glasses of wine per week    Drug use: No       ALLERGIES    Allergies   Allergen Reactions    Morphine Hives       MEDICATIONS    Current Outpatient Medications on File Prior to Encounter   Medication Sig Dispense Refill    folic acid (FOLVITE) 1 MG tablet Take 1 mg by mouth daily 4 tablets daily      MYRBETRIQ 50 MG TB24 Take 50 mg by mouth daily 90 tablet 1    oxybutynin (DITROPAN XL) 15 MG extended release tablet Take 15 mg by mouth daily      Levonorgestrel (MIRENA, 52 MG, IU) by Intrauterine route      ibuprofen (ADVIL;MOTRIN) 200 MG tablet Take 200 mg by mouth every 6 hours as needed for Pain       No current facility-administered medications on file prior to encounter. REVIEW OF SYSTEMS    Pertinent items are noted in HPI. Review of Systems: A 12 point review of symptoms is unremarkable with the exception of the chief complaint. Patient specifically denies nausea, fever, vomiting, chills, shortness of breath, chest pain, abdominal pain, constipation or difficulty urinating. Objective:      /79   Pulse 57   Temp 97.8 °F (36.6 °C) (Oral)   Resp 16   Ht 5' 7\" (1.702 m)   Wt 122 lb 12.7 oz (55.7 kg)   BMI 19.23 kg/m²     Wt Readings from Last 3 Encounters:   04/11/19 122 lb 12.7 oz (55.7 kg)   02/24/19 120 lb (54.4 kg)   08/22/18 125 lb 6.4 oz (56.9 kg)       PHYSICAL EXAM    DP/PT pulses palpable bilaterally. CFT brisk to all digits. Digits are pink and warm to the touch. Hair growth is normal in appearance. No edema noted. No calf pain with palpation noted. Epicritic sensation is grossly absent bilaterally. 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 Hyperkeratotic lesion is noted at the platar medical aspect of the left great toe. After removal of the hyperkeratotic tissue the underlying skin is ulcerated. Wound has fibrotic and nonviable tissue that extends down through and includes the subcutaneous tissue. After debridement the wound has a granular base. There is no surrounding erythema, edema, warmth or malodor noted. The wound does not probe or track to bone. Excisional Debridement Procedure Note  Indications:  Based on my examination of this patient's wound(s)/ulcer(s) today, debridement is required to promote healing and evaluate the wound base. Performed by: Barbara Lynn DPM    Consent obtained? Yes    Time out taken: Yes    Pain Control: Anesthetic: 5% Lidocaine Ointment Topical(0.5cm)     Debridement:Excisional Debridement    Using #15 blade scalpel the wound/ulcer was sharply debrided    down through and included excision of  epidermis, dermis and subcutaneous tissue.         Devitalized Tissue Debrided:  fibrin, slough and callus      Pre Debridement Measurements:  Are located in the [facility]  Documentation Flow Sheet   Wound/Ulcer #: 2     Post  Debridement Measurements:  Wound 04/11/19 Toe (Comment  which one) Left;Medial 32 ( since 2/1/2019) (Active)   Wound Image   4/11/2019  8:17 AM   Dressing Changed Changed/New 4/11/2019  8:21 AM   Wound Length (cm) 0.2 cm 4/11/2019  8:17 AM   Wound Width (cm) 0.8 cm 4/11/2019  8:17 AM   Wound Depth (cm) 0.1 cm 4/11/2019  8:17 AM   Wound Surface Area (cm^2) 0.16 cm^2 4/11/2019  8:17 AM   Wound Volume (cm^3) 0.02 cm^3 4/11/2019  8:17 AM   Post-Procedure Length (cm) 0.4 cm 4/11/2019  8:21 AM   Post-Procedure Width (cm) 1 cm 4/11/2019  8:21 AM   Post-Procedure Depth (cm) 0.1 cm 4/11/2019  8:21 AM   Post-Procedure Surface Area (cm^2) 0.4 cm^2 4/11/2019  8:21 AM   Post-Procedure Volume (cm^3) 0.04 cm^3 4/11/2019  8:21 AM   Wound Assessment Granulation tissue;Slough 4/11/2019  8:17 AM   Drainage Amount Scant 4/11/2019  8:17 AM   Drainage Description Serosanguinous 4/11/2019  8:17 AM   Odor None 4/11/2019  8:17 AM   Margins Attached edges 4/11/2019  8:17 AM Trish-wound Assessment Dodson 4/11/2019  8:17 AM   Non-staged Wound Description Full thickness 4/11/2019  8:17 AM   Dodson%Wound Bed 60 4/11/2019  8:17 AM   Yellow%Wound Bed 40 4/11/2019  8:17 AM   Number of days: 0       Percent of Wound/Ulcer Debrided: 100%    Total Surface Area Debrided:  0.4 sq cm    Diabetic/Pressure/Non Pressure Ulcers only:  Ulcer: Non-Pressure ulcer, fat layer exposed    Bleeding: Minimal    Hemostasis Achieved: by pressure    Response to treatment:  Well tolerated by patient. Assessment:      Patient Active Problem List   Diagnosis Code    Cellulitis and abscess of toe L03.039, L02.619    Spina bifida of lumbar region without hydrocephalus ([facility] [address] Q05.7    Neurogenic bladder N31.9    Hx of osteomyelitis Z87.39          Plan:   E/M x 30 minutes. Discussed with patient at length that she may need surgical intervention to balance her foot to prevent further ulcerations. Patient is going to moving in a couple of months so this will likely need to be done after she relocates. A well padded TCC was applied to the left lower extremity to protect the area until her new AFO is available. Treatment Note please see attached Discharge Instructions    Written patient dismissal instructions given to patient and signed by patient or POA. RTC 1 week    Discharge Instructions       [address] [facility]   Physician Orders and Discharge Instructions  [address] [facility] [place], [place] [address]  Telephone: [phone]    NAME:  Rohan Carson  YOB: 1990  MEDICAL RECORD NUMBER:  [id number]  DATE:  4/11/2019    Wound Cleansing:   Do not scrub or use excessive force. Cleanse wound prior to applying a clean dressing with:  ? Normal Saline x Keep Wound Dry in Shower    ? Wound Cleanser   ? Cleanse wound with Mild Soap & Water  ? May Shower at Discharge   ? Other:       Topical Treatments:  Do not apply lotions, creams, or ointments to wound bed unless directed. ? Apply moisturizing lotion to skin surrounding the wound prior to dressing change. ? Apply antifungal ointment to skin surrounding the wound prior to dressing change. ? Apply thin film of moisture barrier ointment to skin immediately around wound. ? Other:       Dressings:           Wound Location LEFT PLANTAR GREAT TOE    X Apply Primary Dressing:       ? MediHoney Gel ? Alginate with Silver ? Alginate   ? Collagen ? Marina Ran with Silver   ? Santyl with Moisten saline gauze     ? Hydrocolloid   ? MediHoney Alginate ? Foam with Silver   ? Foam   ? Hydrofera Blue    ? Mepilex Border    X Moisten with Saline ? Hydrogel ? Mepitel     ? Bactroban/Mupirocin ? Polysporin  ? Other:    ? Pack wound loosely with  ? Iodoform   ? Plain Packing  ? Other   X Cover and Secure with:     X Gauze ? Maralyn Motto ? Kerlix   ? Ace Wrap ? Cover Roll Tape ? ABD     ? Other:    Avoid contact of tape with skin. X Change dressing: ? Daily    ? Every Other Day ? Three times per week   ? Once a week X Do Not Change Dressing   ? Other:                               . Off-Loading:   X Off-loading when X walking  ? in bed ? sitting  ? Total non-weight bearing  ? Right Leg  ? Left Leg   X Assistive Device ? Aniya Lazar ? Cane  ? Wheelchair  ? Crutches   ? Surgical shoe    ? Podus Boot(s)   ? Foam Boot(s)  ? Roll About    X Cast Boot ? CROW Boot  ? Other:    Contact Cast:  Apply: X Total Contact Cast Applied in Clinic ? RightLeg ? Left Leg   X Do not get cast wet. Contact center or go to emergency room if there is a foul odor or becomes uncomfortable due to feeling tight or swelling. Do not use objects inside of cast to scratch. Dietary:  X Diet as tolerated: ? Calorie Diabetic Diet: ? No Added Salt:  X Increase Protein: ? Other:   Activity:  X Activity as tolerated:  ? Patient has no activity restrictions     ?  Strict Bedrest: ? Remain off Work:     ? May return to full duty work:                                   ? Return to work with restrictions: If you are still having pain after you go home:  X Elevate the affected limb. X Use over-the-counter medications you would normally use for pain as permitted by your family doctor. X For persistent pain not relieved by the above interventions, please call your family doctor. Return Appointment:  ? Wound and dressing supply provider:   ? ECF or Home Healthcare:  ? Wound Assessment: x Physician or NP scheduled for Wound Assessment: NEXT Tuesday April 16TH    ? Ordered tests:     Nurse Case Manger:  Ellen Information: Should you experience any significant changes in your wound(s) or have questions about your wound care, please contact the [address] [place] [address] at [address] 8:30 am - 4:30 pm and Friday 8:30 am - 1:00 pm.  If you need help with your wound outside these hours and cannot wait until we are again available, contact your PCP or go to the hospital emergency room. PLEASE NOTE: IF YOU ARE UNABLE TO OBTAIN WOUND SUPPLIES, CONTINUE TO USE THE SUPPLIES YOU HAVE AVAILABLE UNTIL YOU ARE ABLE TO REACH US. IT IS MOST IMPORTANT TO KEEP THE WOUND COVERED AT ALL TIMES.      Physician Signature:_______________________    Date: ___________ Time:  ____________       x Dr Yen Tovar                           Electronically signed by Yen Tovar DPM on 4/11/2019 at 4:20 PM

## 2019-04-16 ENCOUNTER — HOSPITAL ENCOUNTER (OUTPATIENT)
Dept: WOUND CARE | Age: 29
Discharge: HOME | End: 2019-04-16
Payer: COMMERCIAL

## 2019-04-16 VITALS
SYSTOLIC BLOOD PRESSURE: 104 MMHG | RESPIRATION RATE: 16 BRPM | HEART RATE: 64 BPM | DIASTOLIC BLOOD PRESSURE: 71 MMHG | TEMPERATURE: 97.3 F

## 2019-04-16 DIAGNOSIS — L03.032 CELLULITIS AND ABSCESS OF TOE OF LEFT FOOT: ICD-10-CM

## 2019-04-16 DIAGNOSIS — L02.612 CELLULITIS AND ABSCESS OF TOE OF LEFT FOOT: ICD-10-CM

## 2019-04-16 PROCEDURE — 29445 APPL RIGID TOT CNTC LEG CAST: CPT

## 2019-04-16 ASSESSMENT — PAIN SCALES - GENERAL: PAINLEVEL_OUTOF10: 0

## 2019-04-16 NOTE — PROGRESS NOTES
Contact Cast    NAME:  Mckinley Parker  YOB: 1990  MEDICAL RECORD NUMBER:  [id number]  DATE:  4/16/2019    Goal:  Patient will maintain integrity of cast, avoid mobility hazards, and report complications that may occur (foul odor, pain, numbness, cracked cast). Removed old contact cast if indicated and wash extremity with soap and water. Applied ordered dressing. Applied per Africasana in clinic to left lower leg. Allow cast to dry. Instructed patient to report to health care provider, including wound care center, any back pain, hip pain, or leg pain, numbness of toes, or any odor coming from the cast.   Instructed patient not to stick any foreign objects down into cast.  Instructed patient to utilize assistive devices(crutches, cane or walker) as ordered. Instructed patient to continue offloading as directed.      Applied cast per  Guidelines    Electronically signed by Eliverto Runner, RN on 4/16/2019 at 8:56 AM

## 2019-04-23 ENCOUNTER — HOSPITAL ENCOUNTER (OUTPATIENT)
Dept: WOUND CARE | Age: 29
Discharge: HOME | End: 2019-04-23
Payer: COMMERCIAL

## 2019-04-23 VITALS
DIASTOLIC BLOOD PRESSURE: 66 MMHG | HEART RATE: 64 BPM | RESPIRATION RATE: 16 BRPM | SYSTOLIC BLOOD PRESSURE: 101 MMHG | TEMPERATURE: 97.6 F

## 2019-04-23 DIAGNOSIS — L03.032 CELLULITIS AND ABSCESS OF TOE OF LEFT FOOT: Primary | ICD-10-CM

## 2019-04-23 DIAGNOSIS — L02.612 CELLULITIS AND ABSCESS OF TOE OF LEFT FOOT: Primary | ICD-10-CM

## 2019-04-23 PROCEDURE — 99212 OFFICE O/P EST SF 10 MIN: CPT | Performed by: NURSE PRACTITIONER

## 2019-04-23 PROCEDURE — 99211 OFF/OP EST MAY X REQ PHY/QHP: CPT

## 2019-04-23 ASSESSMENT — PAIN SCALES - GENERAL: PAINLEVEL_OUTOF10: 0

## 2019-04-25 NOTE — PROGRESS NOTES
tobacco: Never Used   Substance Use Topics    Alcohol use: Yes     Alcohol/week: 1.2 oz     Types: 2 Glasses of wine per week    Drug use: No       ALLERGIES    Allergies   Allergen Reactions    Morphine Hives       MEDICATIONS    Current Outpatient Medications on File Prior to Encounter   Medication Sig Dispense Refill    folic acid (FOLVITE) 1 MG tablet Take 1 mg by mouth daily 4 tablets daily      MYRBETRIQ 50 MG TB24 Take 50 mg by mouth daily 90 tablet 1    oxybutynin (DITROPAN XL) 15 MG extended release tablet Take 15 mg by mouth daily      Levonorgestrel (MIRENA, 52 MG, IU) by Intrauterine route      ibuprofen (ADVIL;MOTRIN) 200 MG tablet Take 200 mg by mouth every 6 hours as needed for Pain       No current facility-administered medications on file prior to encounter. REVIEW OF SYSTEMS    Pertinent items are noted in HPI. Objective:      /66   Pulse 64   Temp 97.6 °F (36.4 °C) (Oral)   Resp 16     Wt Readings from Last 3 Encounters:   04/11/19 122 lb 12.7 oz (55.7 kg)   02/24/19 120 lb (54.4 kg)   08/22/18 125 lb 6.4 oz (56.9 kg)       PHYSICAL EXAM    General Appearance: alert and oriented to person, place and time, well-developed and well-nourished, in no acute distress  Skin: warm and dry, no rash or erythema  Head: normocephalic and atraumatic  Eyes: pupils equal, round, and reactive to light  Pulmonary/Chest:  normal air movement, no respiratory distress  Cardiovascular: normal rate, regular rhythm and intact distal pulses  Extremities: no cyanosis      Assessment:        Problem List Items Addressed This Visit     Cellulitis and abscess of toe - Primary           Procedure Note  Indications:  Based on my examination of this patient's wound(s)/ulcer(s) today, debridement is not required to promote healing and evaluate the wound base.     Wound/Ulcer Descriptions are Pre Debridement except measurements:    Wound 04/11/19 Toe (Comment  which one) Left;Medial # 2 left great toe medial ( since 2/1/2019) (Active)   Wound Image   4/23/2019  8:49 AM   Dressing Status Intact;Dry;Clean 4/23/2019  8:49 AM   Dressing Changed Changed/New 4/16/2019  8:30 AM   Dressing/Treatment Collagen with Ag; Other (comment) 4/16/2019  8:30 AM   Wound Length (cm) 0 cm 4/23/2019  8:49 AM   Wound Width (cm) 0 cm 4/23/2019  8:49 AM   Wound Depth (cm) 0 cm 4/23/2019  8:49 AM   Wound Surface Area (cm^2) 0 cm^2 4/23/2019  8:49 AM   Change in Wound Size % (l*w) 100 4/23/2019  8:49 AM   Wound Volume (cm^3) 0 cm^3 4/23/2019  8:49 AM   Wound Healing % 100 4/23/2019  8:49 AM   Post-Procedure Length (cm) 0 cm 4/23/2019  9:16 AM   Post-Procedure Width (cm) 0 cm 4/23/2019  9:16 AM   Post-Procedure Depth (cm) 0 cm 4/23/2019  9:16 AM   Post-Procedure Surface Area (cm^2) 0 cm^2 4/23/2019  9:16 AM   Post-Procedure Volume (cm^3) 0 cm^3 4/23/2019  9:16 AM   Wound Assessment Epithelialization 4/23/2019  8:49 AM   Drainage Amount Scant 4/16/2019  8:30 AM   Drainage Description Serosanguinous 4/16/2019  8:30 AM   Odor None 4/16/2019  8:30 AM   Margins Attached edges 4/16/2019  8:30 AM   Trish-wound Assessment Pink 4/16/2019  8:30 AM   Non-staged Wound Description Full thickness 4/16/2019  8:30 AM   [place]%Wound Bed 80 4/16/2019  8:30 AM   Yellow%Wound Bed 20 4/16/2019  8:30 AM   Number of days: 13          Plan:   Pt education per provider regarding protection of newly healed wound from additional pressure and s/s of re-occurrence. Pt in agreement and questions answered  Treatment Note please see attached Discharge Instructions    Written patient dismissal instructions given to patient and signed by patient or POA.          Discharge Instructions         [address] [place] [facility] [address] [place], [address]  Telephone: [phone]    NAME:  Lizy Rogel  YOB: 1990  MEDICAL RECORD NUMBER:  [id number]  DATE: 4/23/2019    Congratulations! You have completed your treatment. 1. Return to your Primary Care Physician for all your health issues. 2. Resume your ordinary activities as tolerated. 3. Take your medications as prescribed by your primary care physician. 4. Check your skin daily for cracks, bruises, sores, or dryness. Use a moisturizer as needed. 5. Clean and dry your skin, using mild soap and warm water (not hot). 6. Avoid alcohol and caffeine and do not smoke. 7. Maintain a nutritious diet. **PODIATRY PAD TO LEFT GREAT TOE HEALED WOUND AREA - CHANGE EVERY OTHER DAY AFTER DAY UNTIL WE CONTACT YOU NEXT WEEK AFTER CHECKING WITH DR PASTRANA ABOUT FOLLOW UP CARE**        THANK YOU FOR ALLOWING US TO SERVE YOU. PLEASE CALL IF YOU DEVELOP ANOTHER WOUND. [phone])    Physician Signature:_______________________    Date: ___________ Time:  ____________       ? Dr Addison Griggs    ? Dr Brigida Hartley   x Esteban Diop CNP     ? Dr Fito Rockwell  ? Dr Farideh Hernandez   ?  ? Dr Baylee Watts       ? Dr Agustin Stuart   ?  Daryle Rosella NP         Electronically signed by Marina Dorantes RN on 4/23/2019 at 9:11 AM                              Electronically signed by WILLIAM Olsen CNP on 4/25/2019 at 7:31 AM

## 2019-05-14 ENCOUNTER — TELEPHONE (OUTPATIENT)
Dept: PRIMARY CARE CLINIC | Age: 29
End: 2019-05-14

## 2019-05-14 RX ORDER — OXYBUTYNIN CHLORIDE 15 MG/1
15 TABLET, EXTENDED RELEASE ORAL DAILY
Qty: 30 TABLET | Refills: 2 | Status: SHIPPED | OUTPATIENT
Start: 2019-05-14 | End: 2019-07-25 | Stop reason: SDUPTHER

## 2019-05-14 NOTE — TELEPHONE ENCOUNTER
PT CALLING FOR A REFILL ON OXYBUTYNIN TO BE CALLED IN  [address] [phone] IN DN'S NAME NOW.   SHE SAYS SHE HAS RUN OUT OF REFILLS FROM HER FORMER PROVIDER

## 2019-07-11 ENCOUNTER — HOSPITAL ENCOUNTER (OUTPATIENT)
Dept: WOUND CARE | Age: 29
Discharge: HOME | End: 2019-07-11
Payer: COMMERCIAL

## 2019-07-11 VITALS
TEMPERATURE: 97.7 F | DIASTOLIC BLOOD PRESSURE: 73 MMHG | HEART RATE: 62 BPM | BODY MASS INDEX: 19.86 KG/M2 | WEIGHT: 126.54 LBS | SYSTOLIC BLOOD PRESSURE: 107 MMHG | RESPIRATION RATE: 16 BRPM | HEIGHT: 67 IN

## 2019-07-11 PROCEDURE — 11042 DBRDMT SUBQ TIS 1ST 20SQCM/<: CPT

## 2019-07-11 PROCEDURE — 99212 OFFICE O/P EST SF 10 MIN: CPT

## 2019-07-11 ASSESSMENT — PAIN SCALES - GENERAL: PAINLEVEL_OUTOF10: 0

## 2019-07-14 NOTE — PROGRESS NOTES
[facility]   Progress Note and Procedure Note      [address] RECORD NUMBER:  [id number]  AGE: 33 y.o. GENDER: female  : 1990  EPISODE DATE:  2019    Subjective:     Chief Complaint   Patient presents with    Wound Check     Initial Visit on Left Lateral Foot; Pt noticed a blister/bruise last night 7/10/19         HISTORY of PRESENT ILLNESS HPI     Kd Williamson is a 33 y.o. female who presents today for wound/ulcer evaluation. History of Wound Context: patient presents today with a cc of a wound on her left lateral foot. She has had a history of multiple wounds on her left foot with several episodes of OM that have been treated with IV abx and surgery. 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 Patient relates she has been fitted for new AFOs. Patient relates that the wound started last week as a blister while hiking in [place]. Patient relates that she is going to be moving to [place] in a couple of weeks.     Wound/Ulcer Pain Timing/Severity: none  Quality of pain: N/A  Severity:  0 / 10   Modifying Factors: None  Associated Signs/Symptoms: none    Ulcer Identification:  Ulcer Type: neuropathic  Contributing Factors: spinabifida    Wound: N/A        PAST MEDICAL HISTORY        Diagnosis Date    Acute osteomyelitis ([address]     left foot- , ,,, 2017    Neurogenic bladder     [address] [address]        PAST SURGICAL HISTORY    Past Surgical History:   Procedure Laterality Date    APPENDECTOMY      FOOT SURGERY      SPINE SURGERY      Spinal cord detethering     TOE AMPUTATION  2016       FAMILY HISTORY    Family History   Problem Relation Age of Onset    Diabetes Mother     High Blood Pressure Mother     High Cholesterol Mother     High Cholesterol Father     Cancer Maternal Grandmother         lung cancer    Cancer Maternal Grandfather         pancreatic cancer       SOCIAL HISTORY    Social lateral aspect of the left foot sub 5th MPJ  After removal of the hyperkeratotic tissue the underlying skin is ulcerated. Wound has fibrotic and nonviable tissue that extends down through and includes the subcutaneous tissue. After debridement the wound has a granular base. There is no surrounding erythema, edema, warmth or malodor noted. The wound does not probe or track to bone. Excisional Debridement Procedure Note  Indications:  Based on my examination of this patient's wound(s)/ulcer(s) today, debridement is required to promote healing and evaluate the wound base. Performed by: RICARDO WADE    Consent obtained? Yes    Time out taken: Yes    Pain Control: Anesthetic: None     Debridement:Excisional Debridement    Using #15 blade scalpel the wound/ulcer was sharply debrided    down through and included excision of  epidermis, dermis and subcutaneous tissue.         Devitalized Tissue Debrided:  fibrin and slough      Pre Debridement Measurements:  Are located in the [facility]  Documentation Flow Sheet   Wound/Ulcer #: 3     Post  Debridement Measurements:  Wound 07/11/19 Foot Left;Lateral #3 (Active)   Wound Image   7/11/2019  9:24 AM   Wound Pressure Unstageable 7/11/2019 10:00 AM   Dressing Status Intact 7/11/2019  9:24 AM   Dressing Changed Changed/New 7/11/2019 10:00 AM   Wound Length (cm) 1.6 cm 7/11/2019  9:24 AM   Wound Width (cm) 1.8 cm 7/11/2019  9:24 AM   Wound Depth (cm) 0.1 cm 7/11/2019  9:24 AM   Wound Surface Area (cm^2) 2.88 cm^2 7/11/2019  9:24 AM   Wound Volume (cm^3) 0.29 cm^3 7/11/2019  9:24 AM   Post-Procedure Length (cm) 1.8 cm 7/11/2019 10:00 AM   Post-Procedure Width (cm) 2 cm 7/11/2019 10:00 AM   Post-Procedure Depth (cm) 0.1 cm 7/11/2019 10:00 AM   Post-Procedure Surface Area (cm^2) 3.6 cm^2 7/11/2019 10:00 AM   Post-Procedure Volume (cm^3) 0.36 cm^3 7/11/2019 10:00 AM   Wound Assessment Blood filled blister 7/11/2019  9:24 AM   Drainage Amount None 7/11/2019  9:24 AM   Odor RECORD NUMBER:  [id number]  DATE:  7/11/2019    Wound Cleansing:   Do not scrub or use excessive force. Cleanse wound prior to applying a clean dressing with:  [x] Normal Saline [x] Keep Wound Dry in Shower    [] Wound Cleanser   [] Cleanse wound with Mild Soap & Water  [] May Shower at Discharge   [] Other:       Topical Treatments:  Do not apply lotions, creams, or ointments to wound bed unless directed. [x] Apply moisturizing lotion to skin surrounding the wound prior to dressing change.  [] Apply antifungal ointment to skin surrounding the wound prior to dressing change.  [] Apply thin film of moisture barrier ointment to skin immediately around wound. [] Other:       Dressings:           Wound Location LEFT LATERAL FOOT   [x] Apply Primary Dressing:       [x] MediHoney Gel [] Alginate with Silver [] Alginate         [x] Cover and Secure with:     [x] Gauze [x] Cherelle    :     [x] Change dressing: [x] Daily                                    Off-Loading:   [x] Off-loading when [x] walking  [] in bed [] sitting  [] Total non-weight bearing  [] Right Leg  [] Left Leg   [x] Assistive Device [] Walker [] Cane  [] Wheelchair  [] Crutches       [x] Other: BRACES AND INSERTS TO BILAT SHOES       Dietary:  [x] Diet as tolerated: [] Calorie Diabetic Diet: [] No Added Salt:  [x] Increase Protein: [] Other:   Activity:  [x] Activity as tolerated:  [] Patient has no activity restrictions     [] Strict Bedrest: [] Remain off Work:     [] May return to full duty work:                                   [] Return to work with restrictions: If you are still having pain after you go home:  [x] Elevate the affected limb. [x] Use over-the-counter medications you would normally use for pain as permitted by your family doctor. [x] For persistent pain not relieved by the above interventions, please call your family doctor.              Return Appointment:  [] Wound and dressing supply provider:   [] ECF or Home

## 2019-07-22 RX ORDER — MIRABEGRON 50 MG/1
TABLET, FILM COATED, EXTENDED RELEASE ORAL
Qty: 90 TABLET | Refills: 0 | OUTPATIENT
Start: 2019-07-22

## 2019-07-22 NOTE — TELEPHONE ENCOUNTER
Please have pt discuss medication with OB/Gyn, noted ED report of  recent pregnancy, caution is advised during pregnancy

## 2019-07-25 ENCOUNTER — HOSPITAL ENCOUNTER (OUTPATIENT)
Dept: WOUND CARE | Age: 29
Discharge: HOME | End: 2019-07-25
Payer: COMMERCIAL

## 2019-07-25 VITALS
SYSTOLIC BLOOD PRESSURE: 109 MMHG | HEART RATE: 70 BPM | DIASTOLIC BLOOD PRESSURE: 68 MMHG | TEMPERATURE: 98.4 F | RESPIRATION RATE: 16 BRPM

## 2019-07-25 PROCEDURE — 99212 OFFICE O/P EST SF 10 MIN: CPT

## 2019-07-25 RX ORDER — MIRABEGRON 50 MG/1
1 TABLET, FILM COATED, EXTENDED RELEASE ORAL DAILY
Qty: 90 TABLET | Refills: 1 | Status: SHIPPED | OUTPATIENT
Start: 2019-07-25 | End: 2019-07-25

## 2019-07-25 RX ORDER — CEPHALEXIN 500 MG/1
500 CAPSULE ORAL 4 TIMES DAILY
COMMUNITY

## 2019-07-25 RX ORDER — CEPHALEXIN 500 MG/1
500 CAPSULE ORAL 4 TIMES DAILY
Qty: 40 CAPSULE | Refills: 0 | Status: SHIPPED | OUTPATIENT
Start: 2019-07-25 | End: 2019-08-04

## 2019-07-25 RX ORDER — LIDOCAINE HYDROCHLORIDE 40 MG/ML
SOLUTION TOPICAL PRN
Status: DISCONTINUED | OUTPATIENT
Start: 2019-07-25 | End: 2019-07-26 | Stop reason: HOSPADM

## 2019-07-25 RX ORDER — OXYBUTYNIN CHLORIDE 15 MG/1
15 TABLET, EXTENDED RELEASE ORAL DAILY
Qty: 30 TABLET | Refills: 2 | Status: SHIPPED | OUTPATIENT
Start: 2019-07-25

## 2019-07-25 ASSESSMENT — PAIN SCALES - GENERAL
PAINLEVEL_OUTOF10: 0
PAINLEVEL_OUTOF10: 0

## 2019-07-28 NOTE — PROGRESS NOTES
[facility]   Progress Note and Procedure Note      [address] RECORD NUMBER:  [id number]  AGE: 33 y.o. GENDER: female  : 1990  EPISODE DATE:  2019    Subjective:     Chief Complaint   Patient presents with    Wound Check     F/U LEFT FOOT WOUND         HISTORY of PRESENT ILLNESS HPI     Irvin Salcedo is a 33 y.o. female who presents today for wound/ulcer evaluation. History of Wound Context: patient presents today with a cc of a wound on her left lateral foot. She has had a history of multiple wounds on her left foot with several episodes of OM that have been treated with IV abx and surgery. 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 Patient relates she has been fitted for new AFOs. Patient relates that the wound started last week as a blister while hiking in [place]. Patient relates that she is going to be moving to [place] in a couple of weeks. Patient presents today for follow up. While she was on vacation she developed an infection and was seen both at urgent care and was then hospitalized. Patient relates that she has an MRI jass was negative for OM. She relates that she had an I&D procedure and there was not an abscess, but there was hematoma noted. Patient relates that she is pregnant.  She has been ambulating in surgical shoe and crutches since being released from the hospital.    Wound/Ulcer Pain Timing/Severity: none  Quality of pain: N/A  Severity:  0 / 10   Modifying Factors: None  Associated Signs/Symptoms: none    Ulcer Identification:  Ulcer Type: neuropathic  Contributing Factors: spinabifida    Wound: N/A        PAST MEDICAL HISTORY        Diagnosis Date    Acute osteomyelitis ([address]     left foot- 2004, ,,2016, 2017    Neurogenic bladder     Spina [address] [address]        PAST SURGICAL HISTORY    Past Surgical History:   Procedure Laterality Date    APPENDECTOMY      FOOT SURGERY      SPINE